# Patient Record
Sex: MALE | Race: WHITE
[De-identification: names, ages, dates, MRNs, and addresses within clinical notes are randomized per-mention and may not be internally consistent; named-entity substitution may affect disease eponyms.]

---

## 2020-08-16 ENCOUNTER — HOSPITAL ENCOUNTER (EMERGENCY)
Dept: HOSPITAL 54 - ER | Age: 35
Discharge: HOME | End: 2020-08-16
Payer: SELF-PAY

## 2020-08-16 VITALS — HEIGHT: 70 IN | WEIGHT: 174 LBS | BODY MASS INDEX: 24.91 KG/M2

## 2020-08-16 VITALS — SYSTOLIC BLOOD PRESSURE: 127 MMHG | DIASTOLIC BLOOD PRESSURE: 78 MMHG

## 2020-08-16 DIAGNOSIS — F19.10: Primary | ICD-10-CM

## 2020-08-16 DIAGNOSIS — F22: ICD-10-CM

## 2020-08-16 PROCEDURE — 99283 EMERGENCY DEPT VISIT LOW MDM: CPT

## 2020-08-16 PROCEDURE — 96372 THER/PROPH/DIAG INJ SC/IM: CPT

## 2020-08-16 NOTE — NUR
PT AAOX4. BIB EMS C/O BIZARRE BEHAVIOR. STATED HE NEEDED SOME WATER AND TO BE 
HYDRATED. PT APPEARS TO BE PARANOID. MD AT BEDSIDE FOR EVAL. -SI -HI.

## 2020-08-17 ENCOUNTER — HOSPITAL ENCOUNTER (EMERGENCY)
Dept: HOSPITAL 54 - ER | Age: 35
LOS: 1 days | Discharge: HOME | End: 2020-08-18
Payer: SELF-PAY

## 2020-08-17 VITALS — WEIGHT: 165 LBS | HEIGHT: 65 IN | BODY MASS INDEX: 27.49 KG/M2
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
## 2020-08-17 DIAGNOSIS — R46.1: ICD-10-CM
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
## 2020-08-17 DIAGNOSIS — R41.82: ICD-10-CM
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
## 2020-08-17 DIAGNOSIS — S80.211A: ICD-10-CM

## 2020-08-17 DIAGNOSIS — S80.212A: Primary | ICD-10-CM

## 2020-08-17 LAB
ALBUMIN SERPL BCP-MCNC: 4.4 G/DL (ref 3.4–5)
ALP SERPL-CCNC: 83 U/L (ref 46–116)
ALT SERPL W P-5'-P-CCNC: 31 U/L (ref 12–78)
APAP SERPL-MCNC: < 10 UG/ML (ref 10–30)
APPEARANCE UR: (no result)
AST SERPL W P-5'-P-CCNC: 85 U/L (ref 15–37)
BASOPHILS # BLD AUTO: 0.1 /CMM (ref 0–0.2)
BASOPHILS NFR BLD AUTO: 0.4 % (ref 0–2)
BILIRUB DIRECT SERPL-MCNC: 0.3 MG/DL (ref 0–0.2)
BILIRUB SERPL-MCNC: 1.5 MG/DL (ref 0.2–1)
BILIRUB UR QL STRIP: (no result)
BUN SERPL-MCNC: 26 MG/DL (ref 7–18)
CALCIUM SERPL-MCNC: 9.2 MG/DL (ref 8.5–10.1)
CHLORIDE SERPL-SCNC: 102 MMOL/L (ref 98–107)
CO2 SERPL-SCNC: 22 MMOL/L (ref 21–32)
COLOR UR: YELLOW
CREAT SERPL-MCNC: 1.6 MG/DL (ref 0.6–1.3)
EOSINOPHIL NFR BLD AUTO: 0 % (ref 0–6)
ETHANOL SERPL-MCNC: < 3 MG/DL (ref 0–0)
GLUCOSE SERPL-MCNC: 93 MG/DL (ref 74–106)
GLUCOSE UR STRIP-MCNC: NEGATIVE MG/DL
HCT VFR BLD AUTO: 47 % (ref 39–51)
HGB BLD-MCNC: 15.1 G/DL (ref 13.5–17.5)
HGB UR QL STRIP: (no result) ERY/UL
KETONES UR STRIP-MCNC: (no result) MG/DL
LEUKOCYTE ESTERASE UR QL STRIP: NEGATIVE
LYMPHOCYTES NFR BLD AUTO: 1 /CMM (ref 0.8–4.8)
LYMPHOCYTES NFR BLD AUTO: 6.5 % (ref 20–44)
MCHC RBC AUTO-ENTMCNC: 33 G/DL (ref 31–36)
MCV RBC AUTO: 97 FL (ref 80–96)
MONOCYTES NFR BLD AUTO: 13.2 % (ref 2–12)
MONOCYTES NFR BLD AUTO: 2.1 /CMM (ref 0.1–1.3)
NEUTROPHILS # BLD AUTO: 12.5 /CMM (ref 1.8–8.9)
NEUTROPHILS NFR BLD AUTO: 79.9 % (ref 43–81)
NITRITE UR QL STRIP: NEGATIVE
PH UR STRIP: 6 [PH] (ref 5–8)
PLATELET # BLD AUTO: 300 /CMM (ref 150–450)
POTASSIUM SERPL-SCNC: 3.5 MMOL/L (ref 3.5–5.1)
PROT SERPL-MCNC: 8.4 G/DL (ref 6.4–8.2)
PROT UR QL STRIP: >=300 MG/DL
RBC # BLD AUTO: 4.8 MIL/UL (ref 4.5–6)
RBC #/AREA URNS HPF: (no result) /HPF (ref 0–2)
SALICYLATES SERPL-MCNC: 0.5 MG/DL (ref 2.8–20)
SODIUM SERPL-SCNC: 139 MMOL/L (ref 136–145)
UROBILINOGEN UR STRIP-MCNC: 0.2 EU/DL
WBC #/AREA URNS HPF: (no result) /HPF (ref 0–3)
WBC NRBC COR # BLD AUTO: 15.6 K/UL (ref 4.3–11)

## 2020-08-17 PROCEDURE — 80305 DRUG TEST PRSMV DIR OPT OBS: CPT

## 2020-08-17 PROCEDURE — 80329 ANALGESICS NON-OPIOID 1 OR 2: CPT

## 2020-08-17 PROCEDURE — 81001 URINALYSIS AUTO W/SCOPE: CPT

## 2020-08-17 PROCEDURE — 36415 COLL VENOUS BLD VENIPUNCTURE: CPT

## 2020-08-17 PROCEDURE — G0480 DRUG TEST DEF 1-7 CLASSES: HCPCS

## 2020-08-17 PROCEDURE — 85025 COMPLETE CBC W/AUTO DIFF WBC: CPT

## 2020-08-17 PROCEDURE — 80307 DRUG TEST PRSMV CHEM ANLYZR: CPT

## 2020-08-17 PROCEDURE — 80076 HEPATIC FUNCTION PANEL: CPT

## 2020-08-17 PROCEDURE — 99285 EMERGENCY DEPT VISIT HI MDM: CPT

## 2020-08-17 PROCEDURE — 96374 THER/PROPH/DIAG INJ IV PUSH: CPT

## 2020-08-17 PROCEDURE — 70450 CT HEAD/BRAIN W/O DYE: CPT

## 2020-08-17 PROCEDURE — 96372 THER/PROPH/DIAG INJ SC/IM: CPT

## 2020-08-17 PROCEDURE — 80048 BASIC METABOLIC PNL TOTAL CA: CPT

## 2020-08-17 NOTE — NUR
ASSESSED PT ON BED ASLEEP BUT EASILY AROUSABLE. PT IS AAOX1, NOT IN RESPIRATORY 
DISTRESS, V/S STABLE, KEPT RESTED AND COMFORTABLE. WILL CONTINUE TO MONITOR.

## 2020-08-17 NOTE — NUR
tried to speak to the patient. Patient is agitated with [employer] at bedside. 
This SW will return try to speak with the patient again after lunch.



SW remains available for all needs.

## 2020-08-17 NOTE — NUR
tried to speak to the patient. [employer] no longer at bedside. Patient was 
asleep, this SW was given assistance to wake up the patient. Patient would open and close 
eyes and complain of another patient but was not able to provide all the necessary 
information to this SW. SW to try again before end of day. 



SW to remain available for all needs.

## 2020-08-17 NOTE — NUR
PT BIBRA AND [employer] FOR ACTING BIZZARE. PER [employer] PT WAS WALKING ON TOP OF A ROOF 
WHICH THEN WAS FOUND RUNNING ON THE STREET AND [facility] WAS CALLED TO BRING INTO 
E.D. PT WAS GIVEN 10MG VERSED PTA. UPON ARRIVAL PT APPEARS TO BE ANXIOUS. 
PLACED IN BED 15 ON MONITOR AND PULSE OX.

## 2020-08-17 NOTE — NUR
PT BECOME MORE RESTLESS, AND AGITATED. PT SCREAMING. ER MD AT BEDSIDE TO 
RE-EVAL PT WITH ORDERS RECEIVED. WILL CARRY OUT ORDERS.

## 2020-08-18 VITALS — SYSTOLIC BLOOD PRESSURE: 127 MMHG | DIASTOLIC BLOOD PRESSURE: 78 MMHG

## 2020-08-18 NOTE — NUR
Patient discharged to home in stable condition. Written and verbal after care 
instructions given. Patient verbalizes understanding of instruction. Pt 
ambulated with steady gait, was given water and food. AAOX4.

## 2021-01-26 ENCOUNTER — HOSPITAL ENCOUNTER (EMERGENCY)
Dept: HOSPITAL 87 - ER | Age: 36
Discharge: HOME | End: 2021-01-26
Payer: SELF-PAY

## 2021-01-26 ENCOUNTER — HOSPITAL ENCOUNTER (EMERGENCY)
Dept: HOSPITAL 87 - ER | Age: 36
Discharge: HOME | End: 2021-01-26
Payer: COMMERCIAL

## 2021-01-26 VITALS — SYSTOLIC BLOOD PRESSURE: 129 MMHG | DIASTOLIC BLOOD PRESSURE: 79 MMHG

## 2021-01-26 VITALS — WEIGHT: 158.73 LBS | BODY MASS INDEX: 24.06 KG/M2 | HEIGHT: 68 IN

## 2021-01-26 VITALS — WEIGHT: 165.35 LBS | HEIGHT: 70 IN | BODY MASS INDEX: 23.67 KG/M2

## 2021-01-26 VITALS — SYSTOLIC BLOOD PRESSURE: 116 MMHG | DIASTOLIC BLOOD PRESSURE: 79 MMHG

## 2021-01-26 DIAGNOSIS — Z98.890: ICD-10-CM

## 2021-01-26 DIAGNOSIS — R11.0: ICD-10-CM

## 2021-01-26 DIAGNOSIS — R10.9: Primary | ICD-10-CM

## 2021-01-26 DIAGNOSIS — R10.11: ICD-10-CM

## 2021-01-26 DIAGNOSIS — R10.12: Primary | ICD-10-CM

## 2021-01-26 LAB
APPEARANCE UR: CLEAR
BASOPHILS NFR BLD AUTO: 2.6 % (ref 0–2)
CHLORIDE SERPL-SCNC: 105 MEQ/L (ref 98–107)
COLOR UR: YELLOW
EOSINOPHIL NFR BLD AUTO: 1.3 % (ref 0–5)
ERYTHROCYTE [DISTWIDTH] IN BLOOD BY AUTOMATED COUNT: 14.4 % (ref 11.6–14.6)
HCT VFR BLD AUTO: 42.1 % (ref 42–52)
HGB BLD-MCNC: 14.2 G/DL (ref 14–18)
HGB UR QL STRIP: NEGATIVE
INR PPP: 1
KETONES UR STRIP-MCNC: (no result) MG/DL
LEUKOCYTE ESTERASE UR QL STRIP: NEGATIVE
LYMPHOCYTES NFR BLD AUTO: 28.6 % (ref 20–50)
MCH RBC QN AUTO: 31.9 PG (ref 28–32)
MCV RBC AUTO: 94.9 FL (ref 80–94)
MONOCYTES NFR BLD AUTO: 11.2 % (ref 2–8)
NEUTROPHILS NFR BLD AUTO: 56.3 % (ref 40–76)
NITRITE UR QL STRIP: NEGATIVE
PH UR STRIP: 6 [PH] (ref 4.5–8)
PLATELET # BLD AUTO: 299 X1000/UL (ref 130–400)
PMV BLD AUTO: 8.9 FL (ref 7.4–10.4)
PROT UR QL STRIP: NEGATIVE
PROTHROMBIN TIME: 10.5 SEC (ref 9.6–11)
RBC # BLD AUTO: 4.44 MILL/UL (ref 4.7–6.1)
SP GR UR STRIP: 1.02 (ref 1–1.03)
UROBILINOGEN UR STRIP-MCNC: 1 E.U./DL (ref 0.2–1)

## 2021-01-26 PROCEDURE — 81003 URINALYSIS AUTO W/O SCOPE: CPT

## 2021-01-26 PROCEDURE — 93005 ELECTROCARDIOGRAM TRACING: CPT

## 2021-01-26 PROCEDURE — 85025 COMPLETE CBC W/AUTO DIFF WBC: CPT

## 2021-01-26 PROCEDURE — 83690 ASSAY OF LIPASE: CPT

## 2021-01-26 PROCEDURE — 96372 THER/PROPH/DIAG INJ SC/IM: CPT

## 2021-01-26 PROCEDURE — 96374 THER/PROPH/DIAG INJ IV PUSH: CPT

## 2021-01-26 PROCEDURE — 99283 EMERGENCY DEPT VISIT LOW MDM: CPT

## 2021-01-26 PROCEDURE — 80053 COMPREHEN METABOLIC PANEL: CPT

## 2021-01-26 PROCEDURE — 36415 COLL VENOUS BLD VENIPUNCTURE: CPT

## 2021-01-26 PROCEDURE — 99284 EMERGENCY DEPT VISIT MOD MDM: CPT

## 2021-01-26 PROCEDURE — 85610 PROTHROMBIN TIME: CPT
